# Patient Record
Sex: MALE | Race: ASIAN | NOT HISPANIC OR LATINO
[De-identification: names, ages, dates, MRNs, and addresses within clinical notes are randomized per-mention and may not be internally consistent; named-entity substitution may affect disease eponyms.]

---

## 2022-07-13 PROBLEM — Z00.00 ENCOUNTER FOR PREVENTIVE HEALTH EXAMINATION: Status: ACTIVE | Noted: 2022-07-13

## 2022-07-14 ENCOUNTER — APPOINTMENT (OUTPATIENT)
Dept: CARDIOLOGY | Facility: CLINIC | Age: 72
End: 2022-07-14

## 2022-07-14 ENCOUNTER — NON-APPOINTMENT (OUTPATIENT)
Age: 72
End: 2022-07-14

## 2022-07-14 VITALS
HEART RATE: 102 BPM | RESPIRATION RATE: 16 BRPM | TEMPERATURE: 97.6 F | OXYGEN SATURATION: 100 % | BODY MASS INDEX: 25.87 KG/M2 | WEIGHT: 146 LBS | HEIGHT: 62.99 IN | SYSTOLIC BLOOD PRESSURE: 95 MMHG | DIASTOLIC BLOOD PRESSURE: 59 MMHG

## 2022-07-14 DIAGNOSIS — R06.02 SHORTNESS OF BREATH: ICD-10-CM

## 2022-07-14 PROCEDURE — 93000 ELECTROCARDIOGRAM COMPLETE: CPT

## 2022-07-14 PROCEDURE — 93306 TTE W/DOPPLER COMPLETE: CPT

## 2022-07-14 PROCEDURE — 99204 OFFICE O/P NEW MOD 45 MIN: CPT | Mod: 25

## 2022-07-28 NOTE — HISTORY OF PRESENT ILLNESS
[FreeTextEntry1] : 71 year-old wheel-chair bound male with HTN, DM, HLD, asthma and hx of hemorrhagic stroke presents for evaluation of low BP. Patient's wife reports that patient started dialysis 2 months ago and his BP is too low. Wife reports that patient has SOB with exertion. Patient denies CP. Patient denies palpitations. Patient denies h/o syncope. Patient is on medication for BPH. He was on Amlodipine 10 mg, Atorvastatin 10 mg, Bystolic 5 mg, Hydralazine 50 mg, but has stopped taking them. He is still taking Tamsulosin 0.4 mg. I advised patient to also stop Tamsulosin since it could cause low BP. I advised patient to undergo an echocardiogram.

## 2022-07-31 PROBLEM — R06.02 SOB (SHORTNESS OF BREATH): Status: ACTIVE | Noted: 2022-07-31

## 2022-10-12 PROBLEM — I95.9 LOW BP: Status: ACTIVE | Noted: 2022-07-28

## 2022-10-13 ENCOUNTER — APPOINTMENT (OUTPATIENT)
Dept: CARDIOLOGY | Facility: CLINIC | Age: 72
End: 2022-10-13

## 2022-10-13 VITALS
DIASTOLIC BLOOD PRESSURE: 74 MMHG | SYSTOLIC BLOOD PRESSURE: 127 MMHG | RESPIRATION RATE: 16 BRPM | HEART RATE: 95 BPM | OXYGEN SATURATION: 98 % | TEMPERATURE: 97.6 F

## 2022-10-13 DIAGNOSIS — I95.9 HYPOTENSION, UNSPECIFIED: ICD-10-CM

## 2022-10-13 PROCEDURE — 99213 OFFICE O/P EST LOW 20 MIN: CPT

## 2022-10-13 NOTE — REASON FOR VISIT
[FreeTextEntry1] : 71 year-old wheel-chair bound male with ESRD on HD, HTN, DM, HLD, asthma and hx of hemorrhagic stroke presents for followup.  \par \par Patient was last seen on 7/14/22 for evaluation of low BP.   I advised patient to also stop Tamsulosin.  I advised patient to undergo an echocardiogram.  Patient was advised to remove less fluid with HD.\par \par He was on Amlodipine 10 mg, Atorvastatin 10 mg, Bystolic 5 mg, Hydralazine 50 mg, but has stopped taking them. He is still taking Tamsulosin 0.4 mg.

## 2022-10-13 NOTE — HISTORY OF PRESENT ILLNESS
[FreeTextEntry1] : 10/13/22 - Patient has been stable.  His BP is not low anymore with less fluid removed during HD.  He did not stop Tamsulosin 0.4 mg.  Wife reports that his BP is at times elevated.  I advised patient to start Metoprolol ER 25 mg.  FU 6 months. \par \par 7/14/22 - Patient's wife reports that patient started dialysis 2 months ago and his BP is too low. Wife reports that patient has SOB with exertion. Patient denies CP. Patient denies palpitations. Patient denies h/o syncope. Patient is on medication for BPH. He was on Amlodipine 10 mg, Atorvastatin 10 mg, Bystolic 5 mg, Hydralazine 50 mg, but has stopped taking them. He is still taking Tamsulosin 0.4 mg. I advised patient to also stop Tamsulosin since it could cause low BP. I advised patient to undergo an echocardiogram.  Patient was advised to remove less fluid with HD.

## 2023-04-12 NOTE — PHYSICAL EXAM
[Well Developed] : well developed [Well Nourished] : well nourished [No Acute Distress] : no acute distress [Normal Conjunctiva] : normal conjunctiva [Normal Venous Pressure] : normal venous pressure [No Carotid Bruit] : no carotid bruit [Normal S1, S2] : normal S1, S2 [No Murmur] : no murmur [No Rub] : no rub [No Gallop] : no gallop [Clear Lung Fields] : clear lung fields [Good Air Entry] : good air entry [No Respiratory Distress] : no respiratory distress  [Soft] : abdomen soft [Non Tender] : non-tender [No Masses/organomegaly] : no masses/organomegaly [Normal Bowel Sounds] : normal bowel sounds [No Edema] : no edema [Normal Gait] : normal gait [No Cyanosis] : no cyanosis [No Clubbing] : no clubbing [No Varicosities] : no varicosities [Moves all extremities] : moves all extremities [No Focal Deficits] : no focal deficits [Normal Speech] : normal speech [Alert and Oriented] : alert and oriented [Normal memory] : normal memory

## 2023-04-13 ENCOUNTER — NON-APPOINTMENT (OUTPATIENT)
Age: 73
End: 2023-04-13

## 2023-04-13 ENCOUNTER — APPOINTMENT (OUTPATIENT)
Dept: CARDIOLOGY | Facility: CLINIC | Age: 73
End: 2023-04-13
Payer: MEDICARE

## 2023-04-13 VITALS
SYSTOLIC BLOOD PRESSURE: 119 MMHG | HEART RATE: 97 BPM | RESPIRATION RATE: 18 BRPM | DIASTOLIC BLOOD PRESSURE: 68 MMHG | WEIGHT: 140 LBS | BODY MASS INDEX: 25.44 KG/M2 | TEMPERATURE: 96.8 F | OXYGEN SATURATION: 98 %

## 2023-04-13 PROCEDURE — 93000 ELECTROCARDIOGRAM COMPLETE: CPT

## 2023-04-13 PROCEDURE — 99213 OFFICE O/P EST LOW 20 MIN: CPT | Mod: 25

## 2023-04-17 NOTE — HISTORY OF PRESENT ILLNESS
[FreeTextEntry1] : 4/13/23-  Patient denies CP, SOB, palpitations. Patient is on statins and insulin. FU in 6 months. \par \par 10/13/22 - Patient has been stable.  His BP is not low anymore with less fluid removed during HD.  He did not stop Tamsulosin 0.4 mg.  Wife reports that his BP is at times elevated.  I advised patient to start Metoprolol ER 25 mg.  FU 6 months. \par \par 7/14/22 - Patient's wife reports that patient started dialysis 2 months ago and his BP is too low. Wife reports that patient has SOB with exertion. Patient denies CP. Patient denies palpitations. Patient denies h/o syncope. Patient is on medication for BPH. He was on Amlodipine 10 mg, Atorvastatin 10 mg, Bystolic 5 mg, Hydralazine 50 mg, but has stopped taking them. He is still taking Tamsulosin 0.4 mg. I advised patient to also stop Tamsulosin since it could cause low BP. I advised patient to undergo an echocardiogram.  Patient underwent an echocardiogram and it showed hyperdynamic LV function without significant valvular pathology.  Patient was advised to remove less fluid with HD.

## 2023-04-17 NOTE — REASON FOR VISIT
[FreeTextEntry1] : 71 year-old wheel-chair bound male with ESRD on HD, HTN, DM, HLD, asthma and hx of hemorrhagic stroke,  presents for followup.  \par \par Patient was last seen on 10/13/22 for followup.  Patient was stable.  His BP was not low anymore with less fluid removed during HD.  He did not stop Tamsulosin 0.4 mg.  Wife reported that his BP is at times was elevated.  I advised patient to start Metoprolol ER 25 mg.  FU 6 months. \par \par He is on Metoprolol ER 25 mg for HTN.  He is on Tamsulosin 0.4 mg for BPH.\par \par Patient underwent an echocardiogram 7/14/22  and it showed hyperdynamic LV function without significant valvular pathology.

## 2023-10-12 ENCOUNTER — APPOINTMENT (OUTPATIENT)
Dept: CARDIOLOGY | Facility: CLINIC | Age: 73
End: 2023-10-12
Payer: MEDICARE

## 2023-10-12 VITALS
WEIGHT: 138 LBS | OXYGEN SATURATION: 100 % | HEART RATE: 78 BPM | RESPIRATION RATE: 16 BRPM | TEMPERATURE: 97.3 F | DIASTOLIC BLOOD PRESSURE: 71 MMHG | BODY MASS INDEX: 25.07 KG/M2 | SYSTOLIC BLOOD PRESSURE: 126 MMHG

## 2023-10-12 PROCEDURE — 99213 OFFICE O/P EST LOW 20 MIN: CPT

## 2024-03-23 PROBLEM — I10 HTN (HYPERTENSION): Status: ACTIVE | Noted: 2023-04-12

## 2024-03-26 ENCOUNTER — APPOINTMENT (OUTPATIENT)
Dept: CARDIOLOGY | Facility: CLINIC | Age: 74
End: 2024-03-26
Payer: MEDICARE

## 2024-03-26 ENCOUNTER — NON-APPOINTMENT (OUTPATIENT)
Age: 74
End: 2024-03-26

## 2024-03-26 VITALS
SYSTOLIC BLOOD PRESSURE: 118 MMHG | HEART RATE: 102 BPM | WEIGHT: 138 LBS | OXYGEN SATURATION: 97 % | BODY MASS INDEX: 25.07 KG/M2 | RESPIRATION RATE: 16 BRPM | DIASTOLIC BLOOD PRESSURE: 68 MMHG

## 2024-03-26 DIAGNOSIS — R00.0 TACHYCARDIA, UNSPECIFIED: ICD-10-CM

## 2024-03-26 DIAGNOSIS — I10 ESSENTIAL (PRIMARY) HYPERTENSION: ICD-10-CM

## 2024-03-26 PROCEDURE — 93000 ELECTROCARDIOGRAM COMPLETE: CPT

## 2024-03-26 PROCEDURE — 99214 OFFICE O/P EST MOD 30 MIN: CPT | Mod: 25

## 2024-03-26 RX ORDER — METOPROLOL SUCCINATE 25 MG/1
25 TABLET, EXTENDED RELEASE ORAL DAILY
Qty: 90 | Refills: 1 | Status: ACTIVE | COMMUNITY
Start: 2022-10-13 | End: 1900-01-01

## 2024-03-30 PROBLEM — R00.0 TACHYCARDIA: Status: ACTIVE | Noted: 2022-07-31

## 2024-03-30 NOTE — REASON FOR VISIT
[FreeTextEntry1] : 73 year-old wheel-chair bound male with ESRD on HD, HTN, DM, HLD, asthma, hemorrhagic stroke, presents for followup.    Patient was last seen on 10/12/23 - Patient reports recent cough on medications. Patient denies CP, SOB, palpitations, or lightheadedness. FU in 6 months.   He is ? Metoprolol ER 25 mg for HTN.  He is on Tamsulosin 0.4 mg for BPH.  Patient underwent an echocardiogram 7/14/22  and it showed hyperdynamic LV function without significant valvular pathology.

## 2024-03-30 NOTE — HISTORY OF PRESENT ILLNESS
[FreeTextEntry1] : 3/26/24 -  Please refer to NP note for HPI. Pt's wife reports pt has elevated HR to 90 bpm (normally 60-70 bpm). Patient denies CP, SOB, palpitations, or lightheadedness. Patient denies h/o syncope.  Pt is on Metoprolol ER 25mg and Tamsulosin 0.4mg.  Pt's home bp is stable, SBP ranged 120-140. Today's /68 P 102.  Exam unremarkable.  ECG showed ST.  He may be hypovolemic from excessive fluid removal from hemodialysis.  I advised [facility] to consider a little less fluid removal.  10/12/23 - Patient reports recent cough on medications. Patient denies CP, SOB, palpitations, or lightheadedness. FU in 6 months.   4/13/23-  Patient denies CP, SOB, palpitations. Patient is on statins and insulin. FU in 6 months.   10/13/22 - Patient has been stable.  His BP is not low anymore with less fluid removed during HD.  He did not stop Tamsulosin 0.4 mg.  Wife reports that his BP is at times elevated.  I advised patient to start Metoprolol ER 25 mg.  FU 6 months.   7/14/22 - Patient's wife reports that patient started dialysis 2 months ago and his BP is too low. Wife reports that patient has SOB with exertion. Patient denies CP. Patient denies palpitations. Patient denies h/o syncope. Patient is on medication for BPH. He was on Amlodipine 10 mg, Atorvastatin 10 mg, Bystolic 5 mg, Hydralazine 50 mg, but has stopped taking them. He is still taking Tamsulosin 0.4 mg. I advised patient to also stop Tamsulosin since it could cause low BP. I advised patient to undergo an echocardiogram.  Patient underwent an echocardiogram and it showed hyperdynamic LV function without significant valvular pathology.  Patient was advised to remove less fluid with HD.

## 2024-10-10 ENCOUNTER — APPOINTMENT (OUTPATIENT)
Dept: CARDIOLOGY | Facility: CLINIC | Age: 74
End: 2024-10-10

## 2024-10-10 VITALS
OXYGEN SATURATION: 97 % | DIASTOLIC BLOOD PRESSURE: 66 MMHG | RESPIRATION RATE: 18 BRPM | BODY MASS INDEX: 25.07 KG/M2 | HEART RATE: 80 BPM | SYSTOLIC BLOOD PRESSURE: 143 MMHG | WEIGHT: 138 LBS

## 2024-10-10 DIAGNOSIS — Z20.822 CONTACT WITH AND (SUSPECTED) EXPOSURE TO COVID-19: ICD-10-CM

## 2024-10-10 DIAGNOSIS — I10 ESSENTIAL (PRIMARY) HYPERTENSION: ICD-10-CM

## 2024-10-10 PROCEDURE — 99214 OFFICE O/P EST MOD 30 MIN: CPT

## 2024-10-10 RX ORDER — COVID-19 MOLECULAR TEST ASSAY
KIT MISCELLANEOUS DAILY
Qty: 2 | Refills: 0 | Status: ACTIVE | COMMUNITY
Start: 2024-10-10 | End: 1900-01-01